# Patient Record
Sex: MALE | Race: WHITE | NOT HISPANIC OR LATINO | Employment: OTHER | URBAN - METROPOLITAN AREA
[De-identification: names, ages, dates, MRNs, and addresses within clinical notes are randomized per-mention and may not be internally consistent; named-entity substitution may affect disease eponyms.]

---

## 2021-05-17 ENCOUNTER — TRANSFERRED RECORDS (OUTPATIENT)
Dept: HEALTH INFORMATION MANAGEMENT | Facility: CLINIC | Age: 81
End: 2021-05-17

## 2021-06-28 ENCOUNTER — OFFICE VISIT (OUTPATIENT)
Dept: ORTHOPEDICS | Facility: CLINIC | Age: 81
End: 2021-06-28
Payer: MEDICARE

## 2021-06-28 DIAGNOSIS — C40.22 OSTEOSARCOMA OF LEFT FEMUR (H): ICD-10-CM

## 2021-06-28 DIAGNOSIS — M25.561 RIGHT MEDIAL KNEE PAIN: Primary | ICD-10-CM

## 2021-06-28 PROCEDURE — 99204 OFFICE O/P NEW MOD 45 MIN: CPT | Performed by: FAMILY MEDICINE

## 2021-06-28 NOTE — PATIENT INSTRUCTIONS
Thanks for coming today.  Ortho/Sports Medicine Clinic  [address] [place] [address]    To schedule future appointments in Ortho Clinic, you may call [phone].    To schedule ordered imaging by your Provider: Call [facility] at [phone]    Played available online at:   [employer] some [email or web address]    Please call if any further questions or concerns [phone] and ask for the Orthopedic Department. Clinic hours 8 am to 5 pm.    Return to clinic if symptoms worsen.

## 2021-06-28 NOTE — LETTER
6/28/2021         RE: Dex Bass  [address] [facility] [address]        Dear Colleague,    Thank you for referring your patient, Dex Bass, to the [facility] [place]. Please see a copy of my visit note below.          [facility]  6/28/2021    Dex Bass's chief complaint for this visit includes:  Chief Complaint   Patient presents with     Consult     right knee pain, recently tried Euflexxa and cortisone without relief      PCP: No Ref-Primary, Physician    Reason for visit:     What part of your body is injured / painful?  right knee    What caused the injury /pain? history of knee pain     How long ago did your injury occur or pain begin? problem is longstanding    What are your most bothersome symptoms? Pain    How would you characterize your symptom?  aching    What makes your symptoms better? Rest    What makes your symptoms worse? Movement    Have you been previously seen for this problem? No    Medical History:    Any recent changes to your medical history? No    Any new medication prescribed since last visit? No    Have you had surgery on this body part before? Surgery when he was 6 to slow the growth of his leg, staples placed in growth plates.     Review of Systems:    Do you have fever, chills, weight loss? No    Do you have any vision problems? No    Do you have any chest pain or edema? No    Do you have any shortness of breath or wheezing?  No    Do you have stomach problems? No    Do you have any urinary track issues? No    Do you have any numbness or focal weakness? No    Do you have diabetes? No    Do you have problems with bleeding or clotting? No    Do you have an rashes or other skin lesions? No       CHIEF COMPLAINT:  Consult (right knee pain, recently tried Euflexxa and cortisone without relief )       HISTORY OF PRESENT ILLNESS  Mr. Bass is a pleasant 81 year old male who presents to clinic today  with a right knee issue.  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  This was resected and left him with the inability to grow in his left leg.  His right femur and tibia growth plates were stapled, arresting his lower extremity growth.  He was left with extremely limited range of motion of his left knee throughout his life.  He has walked with a cane for most of his life.  His right knee has bothered him for years, definitely worse over the past year or so.      Mainly he feels pain at the inferior anterior medial portion of his knee.  He points to this area, discretely.  He does feel tenderness at this area.  This does not radiate, he denies any numbness or tingling.  He was seen by his primary care physician and had a intra-articular corticosteroid injection, unfortunately this did not help to any degree.  He then had a series of hyaluronic acid injections, unfortunately these did not help him either.    Dex also mentions that he is completely alone, both at home and otherwise.  He does not have any family members, nor friends that are living that are in the immediate area to help.  He is having marked difficulty doing his ADLs.        Additional history: as documented    MEDICAL HISTORY  Patient Active Problem List   Diagnosis     Malignant neoplasm of prostate (H)     Erectile dysfunction     Pure hypercholesterolemia     GERD (gastroesophageal reflux disease)     Sleep apnea       Current Outpatient Medications   Medication Sig Dispense Refill     aspirin 325 MG tablet Take 1 tablet by mouth daily. 100 tablet 3     citalopram (CELEXA) 20 MG tablet Take 1 tablet by mouth. 90 tablet 1     Ibuprofen (ADVIL PO) as needed.       lovastatin (MEVACOR) 40 MG tablet Take 1 tablet by mouth At Bedtime. No further refills until labs are done. 100 tablet 3     Multiple Vitamins-Minerals (CENTRUM SILVER PO) Take 1 tablet by mouth daily.        omeprazole (PRILOSEC) 20 MG capsule Take 1 capsule by mouth daily. 90 capsule 3     polyethylene glycol (MIRALAX) powder Mix 1 bottle of Miralax (8.3 oz, 238 grams) with 64 oz of Gatorade in a large pitcher. Follow colonoscopy prep instructions. 238 g 0       Allergies   Allergen Reactions     Cucumber Extract Other (See Comments) and Nausea     Sweats       Family History   Problem Relation Age of Onset     Diabetes Mother          age 86     Diabetes Brother      Cerebrovascular Disease Father          age 88     C.A.D. Brother         3 brothers with hx CABG, Angioplasty, pacemaker     Neurologic Disorder Brother         1 brother  Alzheimers disease     Gastrointestinal Disease Sister         diverticulosis     Gastrointestinal Disease Brother        Additional medical/Social/Surgical histories reviewed in [facility] and updated as appropriate.        PHYSICAL EXAM  General  - normal appearance, in no obvious distress  HEENT  - conjunctivae not injected, moist mucous membranes  CV  - normal popliteal pulse  Pulm  - normal respiratory pattern, non-labored  Musculoskeletal - right knee  - stance: assisted by cane  - inspection: no swelling, no effusion  - palpation: tender over anterior inferior medial knee region and proximal tibia  - ROM: 100 degrees flexion, 0 degrees extension, painful active ROM  - strength: 5/5 in flexion, 5/5 in extension  - special tests:  (-) Lachman  (-) Billie  (-) varus at 0 and 30 degrees flexion  (-) valgus at 0 and 30 degrees flexion  Neuro  - no sensory or motor deficit, grossly normal coordination, normal muscle tone  Skin  - no ecchymosis, erythema, warmth, or induration, no obvious rash  Psych  - interactive, appropriate, normal mood and affect                   ASSESSMENT & PLAN  Mr. Bass is a 81 year old male who presents to clinic today with pain in the right knee region.    I reviewed his x-ray results in the room with him, he had a recent knee x-ray that does  reveal mild to moderate degenerative changes.    Dex's pain is somewhat concerning for either a stress reaction of the proximal medial tibia, or could be secondary to pes anserine bursitis.  I do think bursitis is less likely given his pain pattern and lack of swelling in this area.  Regardless, I do think an MRI is reasonable.  He can have this done at his earliest convenience.  Based upon the results we may consider either some form of bracing, if amenable, or other things that may help him with his ambulation.    Lastly, Dex does mention that he has trouble with most of his ADLs and has no one to help.  I am placing a social work order to assess his situation.    It was a pleasure seeing Dex today.    Rich Mirza DO, [facility]  Primary Care Sports Medicine      This note was constructed using Dragon dictation software, please excuse any minor errors in spelling, grammar, or syntax.        Again, thank you for allowing me to participate in the care of your patient.        Sincerely,        Rich Mirza DO

## 2021-06-28 NOTE — PROGRESS NOTES
[facility]  6/28/2021    Dex Bass's chief complaint for this visit includes:  Chief Complaint   Patient presents with     Consult     right knee pain, recently tried Euflexxa and cortisone without relief      PCP: No Ref-Primary, Physician    Reason for visit:     What part of your body is injured / painful?  right knee    What caused the injury /pain? history of knee pain     How long ago did your injury occur or pain begin? problem is longstanding    What are your most bothersome symptoms? Pain    How would you characterize your symptom?  aching    What makes your symptoms better? Rest    What makes your symptoms worse? Movement    Have you been previously seen for this problem? No    Medical History:    Any recent changes to your medical history? No    Any new medication prescribed since last visit? No    Have you had surgery on this body part before? Surgery when he was 6 to slow the growth of his leg, staples placed in growth plates.     Review of Systems:    Do you have fever, chills, weight loss? No    Do you have any vision problems? No    Do you have any chest pain or edema? No    Do you have any shortness of breath or wheezing?  No    Do you have stomach problems? No    Do you have any urinary track issues? No    Do you have any numbness or focal weakness? No    Do you have diabetes? No    Do you have problems with bleeding or clotting? No    Do you have an rashes or other skin lesions? No       CHIEF COMPLAINT:  Consult (right knee pain, recently tried Euflexxa and cortisone without relief )       HISTORY OF PRESENT ILLNESS  Mr. Bass is a pleasant 81 year old male who presents to clinic today with a right knee issue.  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  This was resected and left him with the inability to grow in his left leg.  His right femur and tibia growth plates were stapled,  arresting his lower extremity growth.  He was left with extremely limited range of motion of his left knee throughout his life.  He has walked with a cane for most of his life.  His right knee has bothered him for years, definitely worse over the past year or so.      Mainly he feels pain at the inferior anterior medial portion of his knee.  He points to this area, discretely.  He does feel tenderness at this area.  This does not radiate, he denies any numbness or tingling.  He was seen by his primary care physician and had a intra-articular corticosteroid injection, unfortunately this did not help to any degree.  He then had a series of hyaluronic acid injections, unfortunately these did not help him either.    Dex also mentions that he is completely alone, both at home and otherwise.  He does not have any family members, nor friends that are living that are in the immediate area to help.  He is having marked difficulty doing his ADLs.        Additional history: as documented    MEDICAL HISTORY  Patient Active Problem List   Diagnosis     Malignant neoplasm of prostate (H)     Erectile dysfunction     Pure hypercholesterolemia     GERD (gastroesophageal reflux disease)     Sleep apnea       Current Outpatient Medications   Medication Sig Dispense Refill     aspirin 325 MG tablet Take 1 tablet by mouth daily. 100 tablet 3     citalopram (CELEXA) 20 MG tablet Take 1 tablet by mouth. 90 tablet 1     Ibuprofen (ADVIL PO) as needed.       lovastatin (MEVACOR) 40 MG tablet Take 1 tablet by mouth At Bedtime. No further refills until labs are done. 100 tablet 3     Multiple Vitamins-Minerals (CENTRUM SILVER PO) Take 1 tablet by mouth daily.       omeprazole (PRILOSEC) 20 MG capsule Take 1 capsule by mouth daily. 90 capsule 3     polyethylene glycol (MIRALAX) powder Mix 1 bottle of Miralax (8.3 oz, 238 grams) with 64 oz of Gatorade in a large pitcher. Follow colonoscopy prep instructions. 238 g 0       Allergies   Allergen  Reactions     Cucumber Extract Other (See Comments) and Nausea     Sweats       Family History   Problem Relation Age of Onset     Diabetes Mother          age 86     Diabetes Brother      Cerebrovascular Disease Father          age 88     C.A.D. Brother         3 brothers with hx CABG, Angioplasty, pacemaker     Neurologic Disorder Brother         1 brother  Alzheimers disease     Gastrointestinal Disease Sister         diverticulosis     Gastrointestinal Disease Brother        Additional medical/Social/Surgical histories reviewed in [facility] and updated as appropriate.        PHYSICAL EXAM  General  - normal appearance, in no obvious distress  HEENT  - conjunctivae not injected, moist mucous membranes  CV  - normal popliteal pulse  Pulm  - normal respiratory pattern, non-labored  Musculoskeletal - right knee  - stance: assisted by cane  - inspection: no swelling, no effusion  - palpation: tender over anterior inferior medial knee region and proximal tibia  - ROM: 100 degrees flexion, 0 degrees extension, painful active ROM  - strength: 5/5 in flexion, 5/5 in extension  - special tests:  (-) Lachman  (-) Billie  (-) varus at 0 and 30 degrees flexion  (-) valgus at 0 and 30 degrees flexion  Neuro  - no sensory or motor deficit, grossly normal coordination, normal muscle tone  Skin  - no ecchymosis, erythema, warmth, or induration, no obvious rash  Psych  - interactive, appropriate, normal mood and affect                   ASSESSMENT & PLAN  Mr. Bass is a 81 year old male who presents to clinic today with pain in the right knee region.    I reviewed his x-ray results in the room with him, he had a recent knee x-ray that does reveal mild to moderate degenerative changes.    Dex's pain is somewhat concerning for either a stress reaction of the proximal medial tibia, or could be secondary to pes anserine bursitis.  I do think bursitis is less likely given his pain pattern and lack of swelling in this  area.  Regardless, I do think an MRI is reasonable.  He can have this done at his earliest convenience.  Based upon the results we may consider either some form of bracing, if amenable, or other things that may help him with his ambulation.    Lastly, Dex does mention that he has trouble with most of his ADLs and has no one to help.  I am placing a social work order to assess his situation.    It was a pleasure seeing Dex today.    Rich Mirza DO, [facility]  Primary Care Sports Medicine      This note was constructed using Dragon dictation software, please excuse any minor errors in spelling, grammar, or syntax.

## 2021-06-29 ENCOUNTER — NURSE TRIAGE (OUTPATIENT)
Dept: NURSING | Facility: CLINIC | Age: 81
End: 2021-06-29

## 2021-06-29 NOTE — TELEPHONE ENCOUNTER
Dex calls and says that he thinks his clinic called him but is not sure. RN then checked Epic but did not see a call to pt. Today. RN told this to pt. And pt. Voiced understanding. COVID 19 Nurse Triage Plan/Patient Instructions    Please be aware that novel coronavirus (COVID-19) may be circulating in the community. If you develop symptoms such as fever, cough, or SOB or if you have concerns about the presence of another infection including coronavirus (COVID-19), please contact your health care provider or visit [email or web address].     Disposition/Instructions    Home care recommended. Follow home care protocol based instructions.    Thank you for taking steps to prevent the spread of this virus.  o Limit your contact with others.  o Wear a simple mask to cover your cough.  o Wash your hands well and often.    Resources    [facility]: About COVID-19: [email or web address]    CDC: What to Do If You're Sick: www.cdc.gov/coronavirus/2019-ncov/about/steps-when-sick.html    CDC: Ending Home Isolation: www.cdc.gov/coronavirus/2019-ncov/hcp/disposition-in-home-patients.html     CDC: Caring for Someone: www.cdc.gov/coronavirus/2019-ncov/if-you-are-sick/care-for-someone.html     [facility]: Interim Guidance for Hospital Discharge to Home: www.health.[facility].[place]/diseases/coronavirus/hcp/hospdischarge.pdf    [facility] clinical trials (COVID-19 research studies): clinicalaffairs.[facility]/n-clinical-trials     Below are the COVID-19 hotlines at the [facility] ([facility]). Interpreters are available.   o For health questions: Call 357-863-1733 or 1-665.960.7918 (7 a.m. to 7 p.m.)  o For questions about schools and childcare: Call 835-940-3532 or 1-488.566.8031 (7 a.m. to 7 p.m.)                     Reason for Disposition    [1] Follow-up call to recent contact AND [2] information only call, no triage required    Additional Information    Negative: [1] Caller is not with the  adult (patient) AND [2] reporting urgent symptoms    Negative: Lab result questions    Negative: Medication questions    Negative: Caller can't be reached by phone    Negative: Caller has already spoken to PCP or another triager    Negative: RN needs further essential information from caller in order to complete triage    Negative: Requesting regular office appointment    Negative: [1] Caller requesting NON-URGENT health information AND [2] PCP's office is the best resource    Negative: Health Information question, no triage required and triager able to answer question    Negative: General information question, no triage required and triager able to answer question    Negative: Question about upcoming scheduled test, no triage required and triager able to answer question    Negative: [1] Caller is not with the adult (patient) AND [2] probable NON-URGENT symptoms    Protocols used: INFORMATION ONLY CALL - NO TRIAGE-A-

## 2021-07-07 ENCOUNTER — ANCILLARY PROCEDURE (OUTPATIENT)
Dept: MRI IMAGING | Facility: CLINIC | Age: 81
End: 2021-07-07
Attending: FAMILY MEDICINE
Payer: MEDICARE

## 2021-07-07 DIAGNOSIS — M25.561 RIGHT MEDIAL KNEE PAIN: ICD-10-CM

## 2021-07-07 PROCEDURE — G1004 CDSM NDSC: HCPCS | Performed by: RADIOLOGY

## 2021-07-07 PROCEDURE — 73721 MRI JNT OF LWR EXTRE W/O DYE: CPT | Mod: RT | Performed by: RADIOLOGY

## 2021-07-20 ENCOUNTER — TELEPHONE (OUTPATIENT)
Dept: ORTHOPEDICS | Facility: CLINIC | Age: 81
End: 2021-07-20

## 2021-07-21 NOTE — TELEPHONE ENCOUNTER
Patient returning call.   No response at all from clinic at time of patients call.  Please call back.

## 2021-07-21 NOTE — TELEPHONE ENCOUNTER
"Attempted to reach patient to relay MRI result message from Dr. Mirza that was sent via Zoji.  No answer & no voicemail available at home phone # on file.    Left message for patient to return call to clinic to relay message below.     Rich Mirza,    7/12/2021 4:21 PM CDT    \"Mary Kowalski,      Attached are your MRI results.  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  There is also degenerative tearing of the meniscus, which is a padding structure inside the knee.  However, this is due to the arthritis and is likely not causing issues aside from the arthritis.    We do have a couple of options.  One option is to undergo a corticosteroid injection in attempt to get some relief.  This would be to help with pain and possibly facilitate physical therapy, if you are interested in doing some strengthening, which I do think will help.  A second option would be to visit with one of our orthopedic surgeons to discuss if a knee replacement might be appropriate.    What are your thoughts?  I hope you are doing well.     DS\"    "

## 2021-07-23 NOTE — TELEPHONE ENCOUNTER
Pt returned phone call.  No one from the care team was available at the time he called.  He's requesting a call back before noon today.  Thanks.

## 2021-07-26 NOTE — TELEPHONE ENCOUNTER
Patient called back, his results were discussed. He is agreeable. He states his knee is feeling better. He states he will be out of town for a few weeks and  will schedule a follow up with he returne

## 2021-08-24 ENCOUNTER — OFFICE VISIT (OUTPATIENT)
Dept: ORTHOPEDICS | Facility: CLINIC | Age: 81
End: 2021-08-24
Payer: MEDICARE

## 2021-08-24 DIAGNOSIS — M25.561 RIGHT MEDIAL KNEE PAIN: Primary | ICD-10-CM

## 2021-08-24 PROCEDURE — 99213 OFFICE O/P EST LOW 20 MIN: CPT | Performed by: FAMILY MEDICINE

## 2021-08-24 NOTE — LETTER
8/24/2021         RE: Dex Bass  [address] [place] [address]        Dear Colleague,    Thank you for referring your patient, Dex Bass, to the [facility]. Please see a copy of my visit note below.    HISTORY OF PRESENT ILLNESS  Mr. Bass is a pleasant 81 year old male following up with right knee pain.  Dex is here to review his MRI.  He is doing quite a bit better than when he last saw me.     PHYSICAL EXAM  General  - normal appearance, in no obvious distress  HEENT  - conjunctivae not injected, moist mucous membranes  CV  - normal popliteal pulse  Pulm  - normal respiratory pattern, non-labored  Musculoskeletal - right knee  - stance: normal gait without limp, no obvious leg length discrepancy  - inspection: trace effusion, no ecchymosis, normal muscle tone, normal bone and joint alignment, no obvious deformity  - palpation: medial joint line tenderness, patella and patellar tendon non-tender, normal popliteal pulse  Neuro  - no sensory or motor deficit, grossly normal coordination, normal muscle tone  Skin  - no ecchymosis, erythema, warmth, or induration, no obvious rash  Psych  - interactive, appropriate, normal mood and affect          ASSESSMENT & PLAN  Mr. Bass is a 81 year old male following up with right knee pain.    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  He also has arthritis in all 3 compartments of his knee.    I am happy that he is feeling better.  We did discuss that he very well may be experiencing some relative healing or decrease in inflammation over the past few months.    We could consider repeat injection based therapy in the future, if needed, otherwise he can follow-up as needed for this and other issues.    It was a pleasure seeing Dex.        Rich Mirza, DO, CAQSM      This note was constructed  using Dragon dictation software, please excuse any minor errors in spelling, grammar, or syntax.        [facility] FOLLOW-UP VISIT 8/24/2021    Dex Bass's chief complaint for this visit includes:  Chief Complaint   Patient presents with     RECHECK     right knee MRI      PCP: No Ref-Primary, Physician      Interval History:     Follow up reason: knee MRI  Medical History:    Any recent changes to your medical history? No    Any new medication prescribed since last visit? No    Review of Systems:    Do you have fever, chills, weight loss? No    Do you have any vision problems? No    Do you have any chest pain or edema? No    Do you have any shortness of breath or wheezing?  No    Do you have stomach problems? No    Do you have any urinary track issues? No    Do you have any numbness or focal weakness? No    Do you have diabetes? No    Do you have problems with bleeding or clotting? No    Do you have an rashes or other skin lesions? No        Again, thank you for allowing me to participate in the care of your patient.        Sincerely,        Rich Mirza, DO

## 2021-08-24 NOTE — PROGRESS NOTES
HISTORY OF PRESENT ILLNESS  Mr. Bass is a pleasant 81 year old male following up with right knee pain.  Dex is here to review his MRI.  He is doing quite a bit better than when he last saw me.     PHYSICAL EXAM  General  - normal appearance, in no obvious distress  HEENT  - conjunctivae not injected, moist mucous membranes  CV  - normal popliteal pulse  Pulm  - normal respiratory pattern, non-labored  Musculoskeletal - right knee  - stance: normal gait without limp, no obvious leg length discrepancy  - inspection: trace effusion, no ecchymosis, normal muscle tone, normal bone and joint alignment, no obvious deformity  - palpation: medial joint line tenderness, patella and patellar tendon non-tender, normal popliteal pulse  Neuro  - no sensory or motor deficit, grossly normal coordination, normal muscle tone  Skin  - no ecchymosis, erythema, warmth, or induration, no obvious rash  Psych  - interactive, appropriate, normal mood and affect          ASSESSMENT & PLAN  Mr. Bass is a 81 year old male following up with right knee pain.    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  He also has arthritis in all 3 compartments of his knee.    I am happy that he is feeling better.  We did discuss that he very well may be experiencing some relative healing or decrease in inflammation over the past few months.    We could consider repeat injection based therapy in the future, if needed, otherwise he can follow-up as needed for this and other issues.    It was a pleasure seeing Dex.        Rich Mirza, , CAQSM      This note was constructed using Dragon dictation software, please excuse any minor errors in spelling, grammar, or syntax.        [facility] FOLLOW-UP VISIT 8/24/2021    Dex Bass's chief complaint for this visit includes:  Chief Complaint   Patient presents with     RECHECK      right knee MRI      PCP: No Ref-Primary, Physician      Interval History:     Follow up reason: knee MRI  Medical History:    Any recent changes to your medical history? No    Any new medication prescribed since last visit? No    Review of Systems:    Do you have fever, chills, weight loss? No    Do you have any vision problems? No    Do you have any chest pain or edema? No    Do you have any shortness of breath or wheezing?  No    Do you have stomach problems? No    Do you have any urinary track issues? No    Do you have any numbness or focal weakness? No    Do you have diabetes? No    Do you have problems with bleeding or clotting? No    Do you have an rashes or other skin lesions? No

## 2022-01-14 ENCOUNTER — OFFICE VISIT (OUTPATIENT)
Dept: ORTHOPEDICS | Facility: CLINIC | Age: 82
End: 2022-01-14
Payer: MEDICARE

## 2022-01-14 DIAGNOSIS — M25.561 CHRONIC PAIN OF RIGHT KNEE: ICD-10-CM

## 2022-01-14 DIAGNOSIS — G89.29 CHRONIC PAIN OF RIGHT KNEE: ICD-10-CM

## 2022-01-14 DIAGNOSIS — M25.561 RIGHT MEDIAL KNEE PAIN: Primary | ICD-10-CM

## 2022-01-14 PROCEDURE — 99213 OFFICE O/P EST LOW 20 MIN: CPT | Mod: 25 | Performed by: FAMILY MEDICINE

## 2022-01-14 PROCEDURE — 20610 DRAIN/INJ JOINT/BURSA W/O US: CPT | Mod: RT | Performed by: FAMILY MEDICINE

## 2022-01-14 RX ORDER — TRIAMCINOLONE ACETONIDE 40 MG/ML
40 INJECTION, SUSPENSION INTRA-ARTICULAR; INTRAMUSCULAR
Status: SHIPPED | OUTPATIENT
Start: 2022-01-14

## 2022-01-14 RX ADMIN — TRIAMCINOLONE ACETONIDE 40 MG: 40 INJECTION, SUSPENSION INTRA-ARTICULAR; INTRAMUSCULAR at 10:35

## 2022-01-14 NOTE — PROGRESS NOTES
HISTORY OF PRESENT ILLNESS  Mr. Bass is a pleasant 81 year old male following up with review..  Dex last saw me this past August, I had ordered an MRI that revealed multiple osteochondromas in addition to arthritis throughout his knee.  Unfortunately his right knee pain has worsened since last seeing me.  He feels pain mostly at the medial aspect, although pain is deep inside.     PHYSICAL EXAM  General  - normal appearance, in no obvious distress  HEENT  - conjunctivae not injected, moist mucous membranes  CV  - normal popliteal pulse  Pulm  - normal respiratory pattern, non-labored  Musculoskeletal - right knee  - stance: mildly antalgic gait, genu varum  - inspection: generalized swelling, trace effusion  - palpation: medial joint line tenderness, patella and patellar tendon non-tender, normal popliteal pulse  - ROM: 100 degrees flexion, 0 degrees extension, painful active ROM  Neuro  - no sensory or motor deficit, grossly normal coordination, normal muscle tone  Skin  - no ecchymosis, erythema, warmth, or induration, no obvious rash  Psych  - interactive, appropriate, normal mood and affect            ASSESSMENT & PLAN  Mr. Bass is a 81 year old male following up with right knee pain.    I had a good discussion with Ortho centering on treatment options, we also reviewed his old imaging.  Through shared decision making we did decide to inject his knee today (see procedure note).    He does know that we can repeat this 3-4 times a year, as helpful.    It was a pleasure seeing Dex.        Rich Mirza DO, CAM      This note was constructed using Dragon dictation software, please excuse any minor errors in spelling, grammar, or syntax.        .        [facility] FOLLOW-UP VISIT 1/14/2022    Dex Bass's chief complaint for this visit includes:  Chief Complaint   Patient presents with     RECHECK     Right knee pain      PCP: No Ref-Primary, Physician    Interval History:      Follow up reason: right knee pain     Following Therapeutic Plan: Yes     Pain: Worsening    Function: Worsening    Medical History:    Any recent changes to your medical history? No    Any new medication prescribed since last visit? No    Review of Systems:    Do you have fever, chills, weight loss? No    Do you have any vision problems? No    Do you have any chest pain or edema? No    Do you have any shortness of breath or wheezing?  No    Do you have stomach problems? No    Do you have any urinary track issues? No    Do you have any numbness or focal weakness? No    Do you have diabetes? No    Do you have problems with bleeding or clotting? No    Do you have an rashes or other skin lesions? No    Large Joint Injection/Arthocentesis: R knee joint    Date/Time: 1/14/2022 10:35 AM  Performed by: Rich Mirza DO  Authorized by: Rich Mirza DO     Indications:  Pain and diagnostic evaluation  Needle Size:  22 G  Guidance: landmark guided    Approach:  Lateral  Location:  Knee      Medications:  40 mg triamcinolone 40 MG/ML  Outcome:  Tolerated well, no immediate complications  Procedure discussed: discussed risks, benefits, and alternatives    Consent Given by:  Patient  Timeout: timeout called immediately prior to procedure    Prep: patient was prepped and draped in usual sterile fashion       PROCEDURE    Knee Injection - Intraarticular  The patient was informed of the risks and the benefits of the procedure and a written consent was signed.  The patient s right knee was prepped with chlorhexidine in sterile fashion.   40 mg of triamcinolone suspension was drawn up into a 5 mL syringe with 4 mL of 1% lidocaine.  Injection was performed using substerile technique.  A 1.5-inch 22-gauge needle was used to enter the lateral aspect of the knee.  Injection performed successfully without difficulty.  There were no complications. The patient tolerated the procedure well. There was negligible bleeding.

## 2022-01-14 NOTE — LETTER
1/14/2022         RE: Dex Bass  [address] [facility] [address]        Dear Colleague,    Thank you for referring your patient, Dex Bass, to the [facility] [place]. Please see a copy of my visit note below.    HISTORY OF PRESENT ILLNESS  Mr. Bass is a pleasant 81 year old male following up with review..  Dex last saw me this past August, I had ordered an MRI that revealed multiple osteochondromas in addition to arthritis throughout his knee.  Unfortunately his right knee pain has worsened since last seeing me.  He feels pain mostly at the medial aspect, although pain is deep inside.     PHYSICAL EXAM  General  - normal appearance, in no obvious distress  HEENT  - conjunctivae not injected, moist mucous membranes  CV  - normal popliteal pulse  Pulm  - normal respiratory pattern, non-labored  Musculoskeletal - right knee  - stance: mildly antalgic gait, genu varum  - inspection: generalized swelling, trace effusion  - palpation: medial joint line tenderness, patella and patellar tendon non-tender, normal popliteal pulse  - ROM: 100 degrees flexion, 0 degrees extension, painful active ROM  Neuro  - no sensory or motor deficit, grossly normal coordination, normal muscle tone  Skin  - no ecchymosis, erythema, warmth, or induration, no obvious rash  Psych  - interactive, appropriate, normal mood and affect            ASSESSMENT & PLAN  Mr. Bsas is a 81 year old male following up with right knee pain.    I had a good discussion with Ortho centering on treatment options, we also reviewed his old imaging.  Through shared decision making we did decide to inject his knee today (see procedure note).    He does know that we can repeat this 3-4 times a year, as helpful.    It was a pleasure seeing Dex.        Rich Mirza, , CAQSM      This note was constructed using Dragon dictation software, please excuse any minor errors in spelling, grammar,  or syntax.        .        [facility] FOLLOW-UP VISIT 1/14/2022    Dex Bass's chief complaint for this visit includes:  Chief Complaint   Patient presents with     RECHECK     Right knee pain      PCP: No Ref-Primary, Physician    Interval History:     Follow up reason: right knee pain     Following Therapeutic Plan: Yes     Pain: Worsening    Function: Worsening    Medical History:    Any recent changes to your medical history? No    Any new medication prescribed since last visit? No    Review of Systems:    Do you have fever, chills, weight loss? No    Do you have any vision problems? No    Do you have any chest pain or edema? No    Do you have any shortness of breath or wheezing?  No    Do you have stomach problems? No    Do you have any urinary track issues? No    Do you have any numbness or focal weakness? No    Do you have diabetes? No    Do you have problems with bleeding or clotting? No    Do you have an rashes or other skin lesions? No    Large Joint Injection/Arthocentesis: R knee joint    Date/Time: 1/14/2022 10:35 AM  Performed by: Rich Mirza DO  Authorized by: Rich Mirza DO     Indications:  Pain and diagnostic evaluation  Needle Size:  22 G  Guidance: landmark guided    Approach:  Lateral  Location:  Knee      Medications:  40 mg triamcinolone 40 MG/ML  Outcome:  Tolerated well, no immediate complications  Procedure discussed: discussed risks, benefits, and alternatives    Consent Given by:  Patient  Timeout: timeout called immediately prior to procedure    Prep: patient was prepped and draped in usual sterile fashion       PROCEDURE    Knee Injection - Intraarticular  The patient was informed of the risks and the benefits of the procedure and a written consent was signed.  The patient s right knee was prepped with chlorhexidine in sterile fashion.   40 mg of triamcinolone suspension was drawn up into a 5 mL syringe with 4 mL of 1% lidocaine.  Injection was  performed using substerile technique.  A 1.5-inch 22-gauge needle was used to enter the lateral aspect of the knee.  Injection performed successfully without difficulty.  There were no complications. The patient tolerated the procedure well. There was negligible bleeding.                     Again, thank you for allowing me to participate in the care of your patient.        Sincerely,        Rich Mirza DO

## 2022-08-12 ENCOUNTER — OFFICE VISIT (OUTPATIENT)
Dept: ORTHOPEDICS | Facility: CLINIC | Age: 82
End: 2022-08-12
Payer: MEDICARE

## 2022-08-12 DIAGNOSIS — M25.561 CHRONIC PAIN OF RIGHT KNEE: ICD-10-CM

## 2022-08-12 DIAGNOSIS — M25.511 CHRONIC RIGHT SHOULDER PAIN: Primary | ICD-10-CM

## 2022-08-12 DIAGNOSIS — G89.29 CHRONIC RIGHT SHOULDER PAIN: Primary | ICD-10-CM

## 2022-08-12 DIAGNOSIS — G89.29 CHRONIC PAIN OF RIGHT KNEE: ICD-10-CM

## 2022-08-12 PROCEDURE — 20610 DRAIN/INJ JOINT/BURSA W/O US: CPT | Mod: XS | Performed by: FAMILY MEDICINE

## 2022-08-12 PROCEDURE — 20610 DRAIN/INJ JOINT/BURSA W/O US: CPT | Mod: RT | Performed by: FAMILY MEDICINE

## 2022-08-12 PROCEDURE — 99214 OFFICE O/P EST MOD 30 MIN: CPT | Mod: 25 | Performed by: FAMILY MEDICINE

## 2022-08-12 RX ORDER — TRIAMCINOLONE ACETONIDE 40 MG/ML
40 INJECTION, SUSPENSION INTRA-ARTICULAR; INTRAMUSCULAR
Status: SHIPPED | OUTPATIENT
Start: 2022-08-12

## 2022-08-12 RX ORDER — GABAPENTIN 100 MG/1
CAPSULE ORAL
COMMUNITY
Start: 2022-05-20

## 2022-08-12 RX ORDER — METHYLPREDNISOLONE ACETATE 40 MG/ML
40 INJECTION, SUSPENSION INTRA-ARTICULAR; INTRALESIONAL; INTRAMUSCULAR; SOFT TISSUE
Status: SHIPPED | OUTPATIENT
Start: 2022-08-12

## 2022-08-12 RX ADMIN — TRIAMCINOLONE ACETONIDE 40 MG: 40 INJECTION, SUSPENSION INTRA-ARTICULAR; INTRAMUSCULAR at 11:30

## 2022-08-12 RX ADMIN — METHYLPREDNISOLONE ACETATE 40 MG: 40 INJECTION, SUSPENSION INTRA-ARTICULAR; INTRALESIONAL; INTRAMUSCULAR; SOFT TISSUE at 11:31

## 2022-08-12 ASSESSMENT — PAIN SCALES - GENERAL: PAINLEVEL: MILD PAIN (2)

## 2022-08-12 NOTE — PROGRESS NOTES
CHIEF COMPLAINT:  New Patient and Pain of the Right Shoulder       HISTORY OF PRESENT ILLNESS  Mr. Bass is a pleasant 82 year old male who presents to clinic today with right shoulder pain.  Dex has been experiencing pain in his right shoulder for the past few months.  He feels pain whenever he reaches to the endrange of flexion, he feels a click and a pop whenever he brings it down.  He feels pain deep inside the shoulder.  This is his dominant arm.    He also has ongoing right knee pain.  This has been getting worse over the past few months as well.  He has to rely on his left leg when ambulating, this is also causing some pain in his low back on the right side.    Additional history: as documented    MEDICAL HISTORY  Patient Active Problem List   Diagnosis     Malignant neoplasm of prostate (H)     Erectile dysfunction     Pure hypercholesterolemia     GERD (gastroesophageal reflux disease)     Sleep apnea       Current Outpatient Medications   Medication Sig Dispense Refill     aspirin 325 MG tablet Take 1 tablet by mouth daily. 100 tablet 3     citalopram (CELEXA) 20 MG tablet Take 1 tablet by mouth. 90 tablet 1     gabapentin (NEURONTIN) 100 MG capsule TAKE 2 CAPSULES BY MOUTH TWICE DAILY       Ibuprofen (ADVIL PO) as needed.       lovastatin (MEVACOR) 40 MG tablet Take 1 tablet by mouth At Bedtime. No further refills until labs are done. 100 tablet 3     Multiple Vitamins-Minerals (CENTRUM SILVER PO) Take 1 tablet by mouth daily.       omeprazole (PRILOSEC) 20 MG capsule Take 1 capsule by mouth daily. 90 capsule 3     polyethylene glycol (MIRALAX) powder Mix 1 bottle of Miralax (8.3 oz, 238 grams) with 64 oz of Gatorade in a large pitcher. Follow colonoscopy prep instructions. 238 g 0       Allergies   Allergen Reactions     Cucumber Extract Other (See Comments) and Nausea     Sweats       Family History   Problem Relation Age of Onset     Diabetes Mother          age 86     Diabetes Brother       Cerebrovascular Disease Father          age 88     C.A.D. Brother         3 brothers with hx CABG, Angioplasty, pacemaker     Neurologic Disorder Brother         1 brother  Alzheimers disease     Gastrointestinal Disease Sister         diverticulosis     Gastrointestinal Disease Brother        Additional medical/Social/Surgical histories reviewed in [facility] and updated as appropriate.        PHYSICAL EXAM  General  - normal appearance, in no obvious distress  Musculoskeletal - right shoulder  - inspection: normal bone and joint alignment, no obvious deformity, no scapular winging, no AC step-off  - palpation: mildly tender RC insertion, normal clavicle, non-tender AC  - ROM: Painful endrange flexion, palpable and audible click with eccentric flexion  - strength: 5/5  strength, 5/5 in all shoulder planes  - special tests:  (-) Speed's  (+) Neer  (+) Hawkin's  (+) Ken's  Neuro  - no sensory or motor deficit, grossly normal coordination, normal muscle tone                 ASSESSMENT & PLAN  Mr. Bass is a 82 year old male who presents to clinic today with right shoulder pain.    I ordered and independently reviewed an x-ray of his shoulder, this shows mild glenohumeral osteoarthritis with a superior lateral head enthesophyte.    His symptoms are likely secondary to a rotator cuff issue.  We did decide to inject his shoulder today on a diagnostic and therapeutic basis (see procedure note).    Through shared decision making we also decided to inject his right knee today as well.    If his lumbar spine is still bothering him over the course of the next few weeks we could consider working this up as well, he has had extensive work-up including injections at [facility] in the past.    It was a pleasure seeing Dex today.    Rich Mirza DO, [facility]  Primary Care Sports Medicine      This note was constructed using Dragon dictation software, please excuse any minor errors in spelling, grammar, or syntax.

## 2022-08-12 NOTE — NURSING NOTE
Chief Complaint   Patient presents with     Right Shoulder - New Patient, Pain       There were no vitals filed for this visit.    There is no height or weight on file to calculate BMI.      PAUL Garay NREMT

## 2022-08-12 NOTE — LETTER
8/12/2022         RE: Dex Bass  [address] [facility] [address]        Dear Colleague,    Thank you for referring your patient, Dex Bass, to the [facility] [place]. Please see a copy of my visit note below.    CHIEF COMPLAINT:  New Patient and Pain of the Right Shoulder       HISTORY OF PRESENT ILLNESS  Mr. Bass is a pleasant 82 year old male who presents to clinic today with right shoulder pain.  Dex has been experiencing pain in his right shoulder for the past few months.  He feels pain whenever he reaches to the endrange of flexion, he feels a click and a pop whenever he brings it down.  He feels pain deep inside the shoulder.  This is his dominant arm.    He also has ongoing right knee pain.  This has been getting worse over the past few months as well.  He has to rely on his left leg when ambulating, this is also causing some pain in his low back on the right side.    Additional history: as documented    MEDICAL HISTORY  Patient Active Problem List   Diagnosis     Malignant neoplasm of prostate (H)     Erectile dysfunction     Pure hypercholesterolemia     GERD (gastroesophageal reflux disease)     Sleep apnea       Current Outpatient Medications   Medication Sig Dispense Refill     aspirin 325 MG tablet Take 1 tablet by mouth daily. 100 tablet 3     citalopram (CELEXA) 20 MG tablet Take 1 tablet by mouth. 90 tablet 1     gabapentin (NEURONTIN) 100 MG capsule TAKE 2 CAPSULES BY MOUTH TWICE DAILY       Ibuprofen (ADVIL PO) as needed.       lovastatin (MEVACOR) 40 MG tablet Take 1 tablet by mouth At Bedtime. No further refills until labs are done. 100 tablet 3     Multiple Vitamins-Minerals (CENTRUM SILVER PO) Take 1 tablet by mouth daily.       omeprazole (PRILOSEC) 20 MG capsule Take 1 capsule by mouth daily. 90 capsule 3     polyethylene glycol (MIRALAX) powder Mix 1 bottle of Miralax (8.3 oz, 238 grams) with 64 oz of Gatorade in a  large pitcher. Follow colonoscopy prep instructions. 238 g 0       Allergies   Allergen Reactions     Cucumber Extract Other (See Comments) and Nausea     Sweats       Family History   Problem Relation Age of Onset     Diabetes Mother          age 86     Diabetes Brother      Cerebrovascular Disease Father          age 88     C.A.D. Brother         3 brothers with hx CABG, Angioplasty, pacemaker     Neurologic Disorder Brother         1 brother  Alzheimers disease     Gastrointestinal Disease Sister         diverticulosis     Gastrointestinal Disease Brother        Additional medical/Social/Surgical histories reviewed in [facility] and updated as appropriate.        PHYSICAL EXAM  General  - normal appearance, in no obvious distress  Musculoskeletal - right shoulder  - inspection: normal bone and joint alignment, no obvious deformity, no scapular winging, no AC step-off  - palpation: mildly tender RC insertion, normal clavicle, non-tender AC  - ROM: Painful endrange flexion, palpable and audible click with eccentric flexion  - strength: 5/5  strength, 5/5 in all shoulder planes  - special tests:  (-) Speed's  (+) Neer  (+) Hawkin's  (+) Ken's  Neuro  - no sensory or motor deficit, grossly normal coordination, normal muscle tone                 ASSESSMENT & PLAN  Mr. Bass is a 82 year old male who presents to clinic today with right shoulder pain.    I ordered and independently reviewed an x-ray of his shoulder, this shows mild glenohumeral osteoarthritis with a superior lateral head enthesophyte.    His symptoms are likely secondary to a rotator cuff issue.  We did decide to inject his shoulder today on a diagnostic and therapeutic basis (see procedure note).    Through shared decision making we also decided to inject his right knee today as well.    If his lumbar spine is still bothering him over the course of the next few weeks we could consider working this up as well, he has had extensive  work-up including injections at [facility] in the past.    It was a pleasure seeing Dex today.    Rich Mirza DO, [facility]  Primary Care Sports Medicine      This note was constructed using Dragon dictation software, please excuse any minor errors in spelling, grammar, or syntax.      Large Joint Injection/Arthocentesis: R knee joint    Date/Time: 8/12/2022 11:30 AM  Performed by: Rich Mirza DO  Authorized by: Rich Mirza DO     Indications:  Pain  Needle Size:  22 G  Guidance: landmark guided    Approach:  Lateral  Location:  Knee      Medications:  40 mg triamcinolone 40 MG/ML  Outcome:  Tolerated well, no immediate complications  Procedure discussed: discussed risks, benefits, and alternatives    Consent Given by:  Patient  Timeout: timeout called immediately prior to procedure    Prep: patient was prepped and draped in usual sterile fashion    Large Joint Injection/Arthocentesis: R subacromial bursa    Date/Time: 8/12/2022 11:31 AM  Performed by: Rich Mirza DO  Authorized by: Rich Mirza DO     Indications:  Pain  Needle Size:  22 G  Guidance: landmark guided    Location:  Shoulder      Site:  R subacromial bursa  Medications:  40 mg methylPREDNISolone 40 MG/ML  Outcome:  Tolerated well, no immediate complications  Procedure discussed: discussed risks, benefits, and alternatives    Consent Given by:  Patient  Timeout: timeout called immediately prior to procedure    Prep: patient was prepped and draped in usual sterile fashion       PROCEDURE    Knee Injection - Intraarticular  The patient was informed of the risks and the benefits of the procedure and a written consent was signed.  The patient s right knee was prepped with chlorhexidine in sterile fashion.   40 mg of triamcinolone suspension was drawn up into a 5 mL syringe with 4 mL of 1% lidocaine.  Injection was performed using substerile technique.  A 1.5-inch 22-gauge needle was used to enter the lateral aspect of the knee.  Injection  performed successfully without difficulty.  There were no complications. The patient tolerated the procedure well. There was negligible bleeding.     Shoulder Injection - subacromial space   The patient was informed of the risks and the benefits of the procedure and a written consent was signed.  The patient s right shoulder was prepped with Betadine in sterile fashion.   40 mg of methylprednisolone suspension was drawn up into a 5 mL syringe with 4 mL of 1% lidocaine.  Injection was performed with sub-sterile technique.  A 1.5-inch 22-gauge needle was used to enter the subacromial space at the posterior glenohumeral joint.  There were no complications. The patient tolerated the procedure well. There was negligible bleeding.                   Again, thank you for allowing me to participate in the care of your patient.        Sincerely,        Rich Mirza DO

## 2022-08-12 NOTE — PROGRESS NOTES
Large Joint Injection/Arthocentesis: R knee joint    Date/Time: 8/12/2022 11:30 AM  Performed by: Rich Mirza DO  Authorized by: Rich Mirza DO     Indications:  Pain  Needle Size:  22 G  Guidance: landmark guided    Approach:  Lateral  Location:  Knee      Medications:  40 mg triamcinolone 40 MG/ML  Outcome:  Tolerated well, no immediate complications  Procedure discussed: discussed risks, benefits, and alternatives    Consent Given by:  Patient  Timeout: timeout called immediately prior to procedure    Prep: patient was prepped and draped in usual sterile fashion    Large Joint Injection/Arthocentesis: R subacromial bursa    Date/Time: 8/12/2022 11:31 AM  Performed by: Rich Mirza DO  Authorized by: Rich Mirza DO     Indications:  Pain  Needle Size:  22 G  Guidance: landmark guided    Location:  Shoulder      Site:  R subacromial bursa  Medications:  40 mg methylPREDNISolone 40 MG/ML  Outcome:  Tolerated well, no immediate complications  Procedure discussed: discussed risks, benefits, and alternatives    Consent Given by:  Patient  Timeout: timeout called immediately prior to procedure    Prep: patient was prepped and draped in usual sterile fashion       PROCEDURE    Knee Injection - Intraarticular  The patient was informed of the risks and the benefits of the procedure and a written consent was signed.  The patient s right knee was prepped with chlorhexidine in sterile fashion.   40 mg of triamcinolone suspension was drawn up into a 5 mL syringe with 4 mL of 1% lidocaine.  Injection was performed using substerile technique.  A 1.5-inch 22-gauge needle was used to enter the lateral aspect of the knee.  Injection performed successfully without difficulty.  There were no complications. The patient tolerated the procedure well. There was negligible bleeding.     Shoulder Injection - subacromial space   The patient was informed of the risks and the benefits of the procedure and a written  consent was signed.  The patient s right shoulder was prepped with Betadine in sterile fashion.   40 mg of methylprednisolone suspension was drawn up into a 5 mL syringe with 4 mL of 1% lidocaine.  Injection was performed with sub-sterile technique.  A 1.5-inch 22-gauge needle was used to enter the subacromial space at the posterior glenohumeral joint.  There were no complications. The patient tolerated the procedure well. There was negligible bleeding.

## 2023-04-13 ENCOUNTER — OFFICE VISIT (OUTPATIENT)
Dept: ORTHOPEDICS | Facility: CLINIC | Age: 83
End: 2023-04-13
Payer: MEDICARE

## 2023-04-13 DIAGNOSIS — M25.512 CHRONIC PAIN OF BOTH SHOULDERS: Primary | ICD-10-CM

## 2023-04-13 DIAGNOSIS — G89.29 CHRONIC PAIN OF BOTH SHOULDERS: Primary | ICD-10-CM

## 2023-04-13 DIAGNOSIS — M25.511 CHRONIC PAIN OF BOTH SHOULDERS: Primary | ICD-10-CM

## 2023-04-13 PROCEDURE — 99213 OFFICE O/P EST LOW 20 MIN: CPT | Mod: 25 | Performed by: FAMILY MEDICINE

## 2023-04-13 PROCEDURE — 20611 DRAIN/INJ JOINT/BURSA W/US: CPT | Mod: 50 | Performed by: FAMILY MEDICINE

## 2023-04-13 RX ORDER — CELECOXIB 100 MG/1
100 CAPSULE ORAL 2 TIMES DAILY PRN
Qty: 60 CAPSULE | Refills: 1 | Status: SHIPPED | OUTPATIENT
Start: 2023-04-13

## 2023-04-13 RX ORDER — METHYLPREDNISOLONE ACETATE 40 MG/ML
40 INJECTION, SUSPENSION INTRA-ARTICULAR; INTRALESIONAL; INTRAMUSCULAR; SOFT TISSUE
Status: SHIPPED | OUTPATIENT
Start: 2023-04-13

## 2023-04-13 RX ADMIN — METHYLPREDNISOLONE ACETATE 40 MG: 40 INJECTION, SUSPENSION INTRA-ARTICULAR; INTRALESIONAL; INTRAMUSCULAR; SOFT TISSUE at 11:55

## 2023-04-13 NOTE — LETTER
4/13/2023         RE: Dex Bass  [address] [facility] [id number]        Dear Colleague,    Thank you for referring your patient, Dex Bass, to the [facility] [place]. Please see a copy of my visit note below.    HISTORY OF PRESENT ILLNESS  Mr. Bass is a pleasant 83 year old male following up with bilateral shoulder pain.  Dex last saw me for his shoulders in August of this past year, I injected his shoulders at that visit with corticosteroid.  Both of his shoulders have been bothering him quite a bit since last seeing me.  He is interested in repeat injections today.  He did have a fall recently directly onto his left shoulder, he was seen at an outside orthopedic institution where he had a reassuring x-ray.     PHYSICAL EXAM  General  - normal appearance, in no obvious distress  Musculoskeletal - right and left shoulder  - inspection: normal bone and joint alignment, no obvious deformity, no scapular winging, no AC step-off  - palpation: mildly tender generally, bilaterally  - ROM:  Painful at 90 deg flexion bilaterally  - strength: 5/5  strength, 5/5 in all shoulder planes    Neuro  - no sensory or motor deficit, grossly normal coordination, normal muscle tone          ASSESSMENT & PLAN  Mr. Bass is a 83 year old male following up with bilateral shoulder pain.    Through shared decision making we did decide to repeat his injections today.  We also discussed assistive devices for walking and fall prevention.    If his symptoms return in the next few months we could consider repeating injections on an as helpful basis.    I am also prescribing him celebrex to take in place of his ibuprofen.    It was a pleasure seeing Dxe.        Rich Mirza DO, CAQSM      This note was constructed using Dragon dictation software, please excuse any minor errors in spelling, grammar, or syntax.    Large Joint Injection/Arthocentesis: bilateral  subacromial bursa    Date/Time: 4/13/2023 11:55 AM    Performed by: Rich Mirza DO  Authorized by: Rich Mirza DO    Indications:  Pain  Needle Size:  22 G  Guidance: ultrasound    Location:  Shoulder  Laterality:  Bilateral      Site:  Bilateral subacromial bursa  Medications (Right):  40 mg methylPREDNISolone 40 MG/ML  Medications (Left):  40 mg methylPREDNISolone 40 MG/ML  Outcome:  Tolerated well, no immediate complications  Procedure discussed: discussed risks, benefits, and alternatives    Consent Given by:  Patient  Timeout: timeout called immediately prior to procedure    Prep: patient was prepped and draped in usual sterile fashion       Bilateral Shoulder Injections - subacromial space     The patient was informed of the risks and the benefits of the procedure and a written consent was signed.    The patient s left shoulder was prepped with Betadine in sterile fashion.   40 mg of methylprednisolone suspension was drawn up into a 5 mL syringe with 4 mL of 1% lidocaine.  Injection was performed with sub-sterile technique.  A 1.5-inch 22-gauge needle was used to enter the subacromial space at the posterior glenohumeral joint.  There were no complications. The patient tolerated the procedure well. There was negligible bleeding.     The patient s right shoulder was prepped with Betadine in sterile fashion.   40 mg of methylprednisolone suspension was drawn up into a 5 mL syringe with 4 mL of 1% lidocaine.  Injection was performed with sub-sterile technique.  A 1.5-inch 22-gauge needle was used to enter the subacromial space at the posterior glenohumeral joint.  There were no complications. The patient tolerated the procedure well. There was negligible bleeding.             Again, thank you for allowing me to participate in the care of your patient.        Sincerely,        Rich Mirza DO

## 2023-04-13 NOTE — NURSING NOTE
[facility]   ORTHOPEDICS & SPORTS MEDICINE  [address] [place] [address]  Dept: [phone]  ______________________________________________________________________________    Patient: Dex Bass   : 1940   MRN: [id number]   2023    INVASIVE PROCEDURE SAFETY CHECKLIST    Date: 2023   Procedure: Bilateral shoulder injections   Patient Name: Dex Bass  MRN: [id number]  YOB: 1940    Action: Complete sections as appropriate. Any discrepancy results in a HARD COPY until resolved.     PRE PROCEDURE:  Patient ID verified with 2 identifiers (name and  or MRN): Yes  Procedure and site verified with patient/designee (when able): Yes  Accurate consent documentation in medical record: Yes  H&P (or appropriate assessment) documented in medical record: Yes  H&P must be up to 20 days prior to procedure and updates within 24 hours of procedure as applicable: NA  Relevant diagnostic and radiology test results appropriately labeled and displayed as applicable: NA  Procedure site(s) marked with provider initials: NA    TIMEOUT:  Time-Out performed immediately prior to starting procedure, including verbal and active participation of all team members addressing the following:Yes  * Correct patient identify  * Confirmed that the correct side and site are marked  * An accurate procedure consent form  * Agreement on the procedure to be done  * Correct patient position  * Relevant images and results are properly labeled and appropriately displayed  * The need to administer antibiotics or fluids for irrigation purposes during the procedure as applicable   * Safety precautions based on patient history or medication use    DURING PROCEDURE: Verification of correct person, site, and procedures any time the responsibility for care of the patient is transferred to another member of the care team.       Prior to injection, verified patient identity using patient's name and  date of birth.  Due to injection administration, patient instructed to remain in clinic for 15 minutes  afterwards, and to report any adverse reaction to me immediately.    Bursa injection was performed.      Drug Amount Wasted:  None.  Vial/Syringe: Single dose vial  Expiration Date:  7/31/2024      Jarrod Arevalo, EMT  April 13, 2023

## 2023-04-13 NOTE — PROGRESS NOTES
HISTORY OF PRESENT ILLNESS  Mr. Bass is a pleasant 83 year old male following up with bilateral shoulder pain.  Dex last saw me for his shoulders in August of this past year, I injected his shoulders at that visit with corticosteroid.  Both of his shoulders have been bothering him quite a bit since last seeing me.  He is interested in repeat injections today.  He did have a fall recently directly onto his left shoulder, he was seen at an outside orthopedic institution where he had a reassuring x-ray.     PHYSICAL EXAM  General  - normal appearance, in no obvious distress  Musculoskeletal - right and left shoulder  - inspection: normal bone and joint alignment, no obvious deformity, no scapular winging, no AC step-off  - palpation: mildly tender generally, bilaterally  - ROM:  Painful at 90 deg flexion bilaterally  - strength: 5/5  strength, 5/5 in all shoulder planes    Neuro  - no sensory or motor deficit, grossly normal coordination, normal muscle tone          ASSESSMENT & PLAN  Mr. Bass is a 83 year old male following up with bilateral shoulder pain.    Through shared decision making we did decide to repeat his injections today.  We also discussed assistive devices for walking and fall prevention.    If his symptoms return in the next few months we could consider repeating injections on an as helpful basis.    I am also prescribing him celebrex to take in place of his ibuprofen.    It was a pleasure seeing Dex.        Rich Mirza DO, [facility]      This note was constructed using Dragon dictation software, please excuse any minor errors in spelling, grammar, or syntax.    Large Joint Injection/Arthocentesis: bilateral subacromial bursa    Date/Time: 4/13/2023 11:55 AM    Performed by: Rich Mirza DO  Authorized by: Rich Mirza DO    Indications:  Pain  Needle Size:  22 G  Guidance: ultrasound    Location:  Shoulder  Laterality:  Bilateral      Site:  Bilateral subacromial  bursa  Medications (Right):  40 mg methylPREDNISolone 40 MG/ML  Medications (Left):  40 mg methylPREDNISolone 40 MG/ML  Outcome:  Tolerated well, no immediate complications  Procedure discussed: discussed risks, benefits, and alternatives    Consent Given by:  Patient  Timeout: timeout called immediately prior to procedure    Prep: patient was prepped and draped in usual sterile fashion       Bilateral Shoulder Injections - subacromial space     The patient was informed of the risks and the benefits of the procedure and a written consent was signed.    The patient s left shoulder was prepped with Betadine in sterile fashion.   40 mg of methylprednisolone suspension was drawn up into a 5 mL syringe with 4 mL of 1% lidocaine.  Injection was performed with sub-sterile technique.  A 1.5-inch 22-gauge needle was used to enter the subacromial space at the posterior glenohumeral joint.  There were no complications. The patient tolerated the procedure well. There was negligible bleeding.     The patient s right shoulder was prepped with Betadine in sterile fashion.   40 mg of methylprednisolone suspension was drawn up into a 5 mL syringe with 4 mL of 1% lidocaine.  Injection was performed with sub-sterile technique.  A 1.5-inch 22-gauge needle was used to enter the subacromial space at the posterior glenohumeral joint.  There were no complications. The patient tolerated the procedure well. There was negligible bleeding.